# Patient Record
Sex: MALE | Race: WHITE | NOT HISPANIC OR LATINO | Employment: STUDENT | URBAN - METROPOLITAN AREA
[De-identification: names, ages, dates, MRNs, and addresses within clinical notes are randomized per-mention and may not be internally consistent; named-entity substitution may affect disease eponyms.]

---

## 2022-05-31 ENCOUNTER — TELEPHONE (OUTPATIENT)
Dept: ORTHOPEDIC SURGERY | Facility: CLINIC | Age: 22
End: 2022-05-31

## 2022-05-31 NOTE — TELEPHONE ENCOUNTER
"Caller: Rosetta Narayanan    Relationship to patient: Mother    Best call back number: [phone]     Type of visit: NEW PATIENT / 2ND OPINION / BILATERAL KNEE PAIN, LEFT > RIGHT / SINCE ~2018 / XR (SINCE OCT-NOV 2021, \"WITHIN THE PAST 6 MONTHS @[facility]\" - MOM ROSETTA INFORMED REPORTS NEED TO BE FAXED & PATIENT NEEDS TO BRING IMAGING DISC) / NO PRIOR BILATERAL KNEE SURGERIES / HISTORY OF OSGOOD-SCHLATTER's     DR RAYGOZA PREVIOUSLY SAW PATIENT 11/29/16 FOR UNRELATED LEFT FINGER INJURY    Requested date: ONLY IN TOWN THIS WEEK TILL FRI 06-03-22     Additional notes: PATIENT'S MOM ROSETTA STATED SHE / THEY ARE FAMILY FRIENDS OF DR RAYGOZA & MOM ASKING IF ANY WAY DR RAYGOZA COULD SEE PATIENT THIS WEEK FOR AN UNBIASED 2ND OPINION     PLEASE CALL / LEAVE VMAIL TO DISCUSS ANY SCHEDULING     THANKS   "

## 2022-06-03 ENCOUNTER — OFFICE VISIT (OUTPATIENT)
Dept: ORTHOPEDIC SURGERY | Facility: CLINIC | Age: 22
End: 2022-06-03

## 2022-06-03 VITALS — BODY MASS INDEX: 32.28 KG/M2 | WEIGHT: 265.1 LBS | TEMPERATURE: 97.1 F | HEIGHT: 76 IN

## 2022-06-03 DIAGNOSIS — M76.52 PATELLAR TENDINITIS OF BOTH KNEES: ICD-10-CM

## 2022-06-03 DIAGNOSIS — M25.562 PAIN IN BOTH KNEES, UNSPECIFIED CHRONICITY: Primary | ICD-10-CM

## 2022-06-03 DIAGNOSIS — M76.51 PATELLAR TENDINITIS OF BOTH KNEES: ICD-10-CM

## 2022-06-03 DIAGNOSIS — M25.561 PAIN IN BOTH KNEES, UNSPECIFIED CHRONICITY: Primary | ICD-10-CM

## 2022-06-03 PROCEDURE — 99203 OFFICE O/P NEW LOW 30 MIN: CPT | Performed by: ORTHOPAEDIC SURGERY

## 2022-06-03 PROCEDURE — 73562 X-RAY EXAM OF KNEE 3: CPT | Performed by: ORTHOPAEDIC SURGERY

## 2022-06-03 RX ORDER — DICLOFENAC SODIUM 20 MG/G
2 SOLUTION TOPICAL 2 TIMES DAILY
Qty: 112 G | Refills: 11 | Status: SHIPPED | OUTPATIENT
Start: 2022-06-03

## 2022-06-03 NOTE — PROGRESS NOTES
New Bilateral Knee      Patient: Norberto Narayanan        YOB: 2000    Medical Record Number: [id number]        Chief Complaints: bilateral knee pain      History of Present Illness: This is a 21-year-old male who plays [employer] who presents complaining of right knee pain he states he had some intermittent problems but really in October started bother him there was no new history injury change in activity.  His pain is anterior it is just inferior to the pole of patella.  Is tried some ultrasound is tried some exercises stretching with the medical team at  symptoms are mild to moderate intermittent worse with activity somewhat better with rest his past medical history is unremarkable        Allergies: No Known Allergies    Medications:   Home Medications:  No current outpatient medications on file prior to visit.     No current facility-administered medications on file prior to visit.     Current Medications:  Scheduled Meds:  Continuous Infusions:No current facility-administered medications for this visit.    PRN Meds:.    History reviewed. No pertinent past medical history.     Past Surgical History:   Procedure Laterality Date   • TEAR DUCT SURGERY  2002   • WISDOM TOOTH EXTRACTION          Social History     Occupational History   • Not on file   Tobacco Use   • Smoking status: Never Smoker   • Smokeless tobacco: Never Used   Vaping Use   • Vaping Use: Never used   Substance and Sexual Activity   • Alcohol use: Yes     Comment: occasionally   • Drug use: Defer   • Sexual activity: Defer      Social History     Social History Narrative   • Not on file        Family History   Problem Relation Age of Onset   • Hypertension Father                  Review of Systems      Physical Exam: 21 y.o. male  General Appearance:    Alert, cooperative, in no acute distress                   Vitals:    06/03/22 0924   Temp: 97.1 °F (36.2 °C)   Weight: 120 kg (265 lb 1.6 oz)   Height: 193 cm  "(76\")   PainSc:   7      Patient is alert and read ×3 no acute distress appears her above-listed at height weight and age.  Affect is normal respiratory rate is normal unlabored. Heart rate regular rate rhythm, sclera, dentition and hearing are normal for the purpose of this exam.        Ortho Exam exam of his right knee has no effusion no redness he does have a prominence at his tibial tubercle from an old [employer] full range of motion no effusion no joint line tenderness negative bounce home negative [place].  He is tight in his hamstrings and his quads he does have point tenderness over the inferior lateral aspect of his patella this is with palpation he otherwise has good function  Procedures             Radiology:   AP, Lateral and merchant views of the right knee  were ordered/reviewed to evauateknee pain.  He does have patella angelito bilaterally little bit of ossification of the distal pole of the left patella everything else looks fine  Imaging Results (Most Recent)     Procedure Component Value Units Date/Time    XR Knee 3 View Bilateral [[id number]] Resulted: 06/03/22 0858     Updated: 06/03/22 0858    Impression:      Ordering physician's impression is located in the Encounter Note dated 06/03/22. X-ray performed in the DR room.          Assessment/Plan:      I suspect this is a localized tendinitis he is sounds like he is done a lot of conservative things.  This is Gill I think a PRP injection would be of great benefit.  Told him to talk with his trainers about this and see if they could do it in [place] if not I am happy to get it organized here                          "